# Patient Record
Sex: FEMALE | Race: WHITE | NOT HISPANIC OR LATINO | Employment: FULL TIME | URBAN - METROPOLITAN AREA
[De-identification: names, ages, dates, MRNs, and addresses within clinical notes are randomized per-mention and may not be internally consistent; named-entity substitution may affect disease eponyms.]

---

## 2017-08-26 ENCOUNTER — OFFICE VISIT (OUTPATIENT)
Dept: URGENT CARE | Facility: CLINIC | Age: 32
End: 2017-08-26
Payer: COMMERCIAL

## 2017-08-26 VITALS
HEART RATE: 81 BPM | HEIGHT: 69 IN | BODY MASS INDEX: 18.81 KG/M2 | DIASTOLIC BLOOD PRESSURE: 62 MMHG | WEIGHT: 127 LBS | SYSTOLIC BLOOD PRESSURE: 104 MMHG | TEMPERATURE: 99 F | RESPIRATION RATE: 16 BRPM | OXYGEN SATURATION: 100 %

## 2017-08-26 DIAGNOSIS — B34.9 VIRAL SYNDROME: Primary | ICD-10-CM

## 2017-08-26 PROCEDURE — 99203 OFFICE O/P NEW LOW 30 MIN: CPT | Mod: 25,S$GLB,, | Performed by: PHYSICIAN ASSISTANT

## 2017-08-26 PROCEDURE — 3008F BODY MASS INDEX DOCD: CPT | Mod: S$GLB,,, | Performed by: PHYSICIAN ASSISTANT

## 2017-08-26 PROCEDURE — 96372 THER/PROPH/DIAG INJ SC/IM: CPT | Mod: S$GLB,,, | Performed by: PHYSICIAN ASSISTANT

## 2017-08-26 RX ORDER — AZITHROMYCIN 250 MG/1
TABLET, FILM COATED ORAL
Qty: 6 TABLET | Refills: 0 | Status: SHIPPED | OUTPATIENT
Start: 2017-08-26 | End: 2018-06-14

## 2017-08-26 RX ORDER — METHYLPREDNISOLONE 4 MG/1
TABLET ORAL
Qty: 1 PACKAGE | Refills: 0 | Status: SHIPPED | OUTPATIENT
Start: 2017-08-27 | End: 2018-06-14

## 2017-08-26 RX ORDER — BETAMETHASONE SODIUM PHOSPHATE AND BETAMETHASONE ACETATE 3; 3 MG/ML; MG/ML
6 INJECTION, SUSPENSION INTRA-ARTICULAR; INTRALESIONAL; INTRAMUSCULAR; SOFT TISSUE ONCE
Status: COMPLETED | OUTPATIENT
Start: 2017-08-26 | End: 2017-08-26

## 2017-08-26 RX ORDER — BENZONATATE 200 MG/1
200 CAPSULE ORAL 3 TIMES DAILY PRN
Qty: 30 CAPSULE | Refills: 0 | Status: SHIPPED | OUTPATIENT
Start: 2017-08-26 | End: 2017-09-05

## 2017-08-26 RX ORDER — CODEINE PHOSPHATE AND GUAIFENESIN 10; 100 MG/5ML; MG/5ML
5 SOLUTION ORAL NIGHTLY PRN
Qty: 50 ML | Refills: 0 | Status: SHIPPED | OUTPATIENT
Start: 2017-08-26 | End: 2017-09-05

## 2017-08-26 RX ADMIN — BETAMETHASONE SODIUM PHOSPHATE AND BETAMETHASONE ACETATE 6 MG: 3; 3 INJECTION, SUSPENSION INTRA-ARTICULAR; INTRALESIONAL; INTRAMUSCULAR; SOFT TISSUE at 06:08

## 2017-08-26 NOTE — PATIENT INSTRUCTIONS
"- Please return here or go to the Emergency Department for any concerns or worsening of condition.   - You have been prescribed antibiotics but your are instructed to withhold taking the antibiotics for the specified period of time discussed by the provider to see if your symptoms improve with other medications prescribed/suggested as your illness may be viral (viruses do not respond to antibiotics). If the antibiotics are started please take them to completion.    - If you were given a steroid shot in the clinic and have also been given a prescription for a steroid such as Prednisone or a Medrol Dose Pack, please begin taking them tomorrow as instructed or as listed on medication directions.    - If you were prescribed a narcotic medication, do not drive or operate heavy equipment or machinery while taking these medications.  - Please follow up with your primary care provider (PCP) or discussed specialist(s) as needed.             Viral Syndrome (Adult)  A viral illness may cause a number of symptoms. The symptoms depend on the part of the body that the virus affects. If it settles in your nose, throat, and lungs, it may cause cough, sore throat, congestion, and sometimes headache. If it settles in your stomach and intestinal tract, it may cause vomiting and diarrhea. Sometimes it causes vague symptoms like "aching all over," feeling tired, loss of appetite, or fever.  A viral illness usually lasts 1 to 2 weeks, but sometimes it lasts longer. In some cases, a more serious infection can look like a viral syndrome in the first few days of the illness. You may need another exam and additional tests to know the difference. Watch for the warning signs listed below.  Home care  Follow these guidelines for taking care of yourself at home:  · If symptoms are severe, rest at home for the first 2 to 3 days.  · Stay away from cigarette smoke - both your smoke and the smoke from others.  · You may use " over-the-counter acetaminophen or ibuprofen for fever, muscle aching, and headache, unless another medicine was prescribed for this. If you have chronic liver or kidney disease or ever had a stomach ulcer or GI bleeding, talk with your doctor before using these medicines. No one who is younger than 18 and ill with a fever should take aspirin. It may cause severe disease or death.  · Your appetite may be poor, so a light diet is fine. Avoid dehydration by drinking 8 to 12 8-ounce glasses of fluids each day. This may include water; orange juice; lemonade; apple, grape, and cranberry juice; clear fruit drinks; electrolyte replacement and sports drinks; and decaffeinated teas and coffee. If you have been diagnosed with a kidney disease, ask your doctor how much and what types of fluids you should drink to prevent dehydration. If you have kidney disease, drinking too much fluid can cause it build up in the your body and be dangerous to your health.  · Over-the-counter remedies won't shorten the length of the illness but may be helpful for cough, sore throat; and nasal and sinus congestion. Don't use decongestants if you have high blood pressure.  Follow-up care  Follow up with your healthcare provider if you do not improve over the next week.  Call 911  Get emergency medical care if any of the following occur:  · Convulsion  · Feeling weak, dizzy, or like you are going to faint  · Chest pain, shortness of breath, wheezing, or difficulty breathing  When to seek medical advice  Call your healthcare provider right away if any of these occur:  · Cough with lots of colored sputum (mucus) or blood in your sputum  · Chest pain, shortness of breath, wheezing, or difficulty breathing  · Severe headache; face, neck, or ear pain  · Severe, constant pain in the lower right side of your belly (abdominal)  · Continued vomiting (cant keep liquids down)  · Frequent diarrhea (more than 5 times a day); blood (red or black color) or mucus  in diarrhea  · Feeling weak, dizzy, or like you are going to faint  · Extreme thirst  · Fever of 100.4°F (38°C) or higher, or as directed by your healthcare provider  Date Last Reviewed: 9/25/2015  © 2976-3227 [employer]. [address] [place] [address]. All rights reserved. This information is not intended as a substitute for professional medical care. Always follow your healthcare professional's instructions.

## 2017-08-26 NOTE — PROGRESS NOTES
Subjective:       Patient ID: Kasey Cherry is a 32 y.o. female.    Chief Complaint: Cough    Patient states she started this mourning with body aches,productive cough, sore throat, and headache.      Cough   This is a new problem. The current episode started today. The problem has been gradually worsening. The problem occurs hourly. The cough is productive of sputum. Associated symptoms include headaches, nasal congestion, postnasal drip, rhinorrhea and a sore throat. Pertinent negatives include no chest pain, chills, ear pain, eye redness, fever, rash, shortness of breath or wheezing. Associated symptoms comments: Sore throat, headache, body ache . The symptoms are aggravated by lying down. Treatments tried: advil, tylenol. The treatment provided mild relief. Her past medical history is significant for asthma. There is no history of bronchitis.     Review of Systems   Constitution: Negative for chills, fever and malaise/fatigue.   HENT: Positive for headaches, postnasal drip, rhinorrhea and sore throat. Negative for congestion, ear pain and hoarse voice.    Eyes: Negative for blurred vision, discharge, pain, redness and visual disturbance.   Cardiovascular: Negative for chest pain, dyspnea on exertion, leg swelling, near-syncope and syncope.   Respiratory: Positive for cough and sputum production. Negative for shortness of breath and wheezing.    Hematologic/Lymphatic: Negative for adenopathy.   Skin: Negative for itching and rash.   Musculoskeletal: Positive for back pain (ache) and neck pain (ache). Negative for stiffness.   Gastrointestinal: Negative for abdominal pain, diarrhea, nausea and vomiting.   Neurological: Negative for dizziness, light-headedness and numbness.   Psychiatric/Behavioral: Negative for altered mental status.   Allergic/Immunologic: Negative for hives.   All other systems reviewed and are negative.      Objective:      Physical Exam   Constitutional: She is oriented to person, place, and  time. She appears well-developed and well-nourished.  Non-toxic appearance. She does not have a sickly appearance. She does not appear ill. No distress.   HENT:   Head: Normocephalic and atraumatic.   Right Ear: Tympanic membrane, external ear and ear canal normal.   Left Ear: Tympanic membrane, external ear and ear canal normal.   Nose: Nose normal. No epistaxis. Right sinus exhibits no maxillary sinus tenderness and no frontal sinus tenderness. Left sinus exhibits no maxillary sinus tenderness and no frontal sinus tenderness.   Mouth/Throat: Uvula is midline. No uvula swelling. Posterior oropharyngeal erythema present. No oropharyngeal exudate or posterior oropharyngeal edema.   Bilateral nasal mucosal erythema and clear congestion   Eyes: Pupils are equal, round, and reactive to light.   Neck: Normal range of motion. Neck supple.   Cardiovascular: Normal rate, regular rhythm and normal heart sounds.  Exam reveals no gallop and no friction rub.    No murmur heard.  Pulmonary/Chest: Effort normal and breath sounds normal. No respiratory distress. She has no decreased breath sounds. She has no wheezes. She has no rhonchi. She has no rales.   Musculoskeletal: Normal range of motion.   Lymphadenopathy:        Head (right side): No submental, no submandibular, no tonsillar, no preauricular, no posterior auricular and no occipital adenopathy present.        Head (left side): No submental, no submandibular, no tonsillar, no preauricular, no posterior auricular and no occipital adenopathy present.     She has no cervical adenopathy.        Right cervical: No posterior cervical adenopathy present.       Left cervical: No posterior cervical adenopathy present.        Right: No supraclavicular adenopathy present.        Left: No supraclavicular adenopathy present.   Neurological: She is alert and oriented to person, place, and time. She is not disoriented. Coordination and gait normal.   Skin: No abrasion, no ecchymosis, no  "laceration and no rash noted. No erythema.   Psychiatric: She has a normal mood and affect. Her behavior is normal.   Nursing note and vitals reviewed.      /62 (BP Location: Right arm, Patient Position: Sitting, BP Method: Medium (Automatic))   Pulse 81   Temp 99 °F (37.2 °C) (Oral)   Resp 16   Ht 5' 9" (1.753 m)   Wt 57.6 kg (127 lb)   LMP 08/03/2017   SpO2 100%   BMI 18.75 kg/m²      Assessment:       1. Viral syndrome        Plan:       Kasey was seen today for cough.    Diagnoses and all orders for this visit:    Viral syndrome  -     betamethasone acetate-betamethasone sodium phosphate injection 6 mg; Inject 1 mL (6 mg total) into the muscle once.  -     methylPREDNISolone (MEDROL DOSEPACK) 4 mg tablet; use as directed  -     benzonatate (TESSALON) 200 MG capsule; Take 1 capsule (200 mg total) by mouth 3 (three) times daily as needed for Cough.  -     guaifenesin-codeine 100-10 mg/5 ml (CHERATUSSIN AC)  mg/5 mL syrup; Take 5 mLs by mouth nightly as needed for Cough.  -     azithromycin (ZITHROMAX Z-BRITTANY) 250 MG tablet; Two tablets once on day one followed by one tablet daily for 5 days    - Please return here or go to the Emergency Department for any concerns or worsening of condition.   - You have been prescribed antibiotics but your are instructed to withhold taking the antibiotics for the specified period of time discussed by the provider to see if your symptoms improve with other medications prescribed/suggested as your illness may be viral (viruses do not respond to antibiotics). If the antibiotics are started please take them to completion.    - If you were given a steroid shot in the clinic and have also been given a prescription for a steroid such as Prednisone or a Medrol Dose Pack, please begin taking them tomorrow as instructed or as listed on medication directions.    - If you were prescribed a narcotic medication, do not drive or operate heavy equipment or machinery while taking " these medications.  - Please follow up with your primary care provider (PCP) or discussed specialist(s) as needed.

## 2018-06-11 ENCOUNTER — OFFICE VISIT (OUTPATIENT)
Dept: URGENT CARE | Facility: CLINIC | Age: 33
End: 2018-06-11
Payer: COMMERCIAL

## 2018-06-11 VITALS
HEIGHT: 69 IN | DIASTOLIC BLOOD PRESSURE: 75 MMHG | TEMPERATURE: 98 F | OXYGEN SATURATION: 99 % | WEIGHT: 127 LBS | HEART RATE: 76 BPM | RESPIRATION RATE: 16 BRPM | BODY MASS INDEX: 18.81 KG/M2 | SYSTOLIC BLOOD PRESSURE: 117 MMHG

## 2018-06-11 DIAGNOSIS — M79.644 PAIN OF RIGHT MIDDLE FINGER: ICD-10-CM

## 2018-06-11 DIAGNOSIS — S63.632A SPRAIN OF INTERPHALANGEAL JOINT OF RIGHT MIDDLE FINGER, INITIAL ENCOUNTER: Primary | ICD-10-CM

## 2018-06-11 PROCEDURE — 99214 OFFICE O/P EST MOD 30 MIN: CPT | Mod: S$GLB,,, | Performed by: EMERGENCY MEDICINE

## 2018-06-11 PROCEDURE — 3008F BODY MASS INDEX DOCD: CPT | Mod: CPTII,S$GLB,, | Performed by: EMERGENCY MEDICINE

## 2018-06-11 RX ORDER — MELOXICAM 15 MG/1
15 TABLET ORAL DAILY
Qty: 14 TABLET | Refills: 0 | Status: SHIPPED | OUTPATIENT
Start: 2018-06-11 | End: 2018-06-25

## 2018-06-11 NOTE — PROGRESS NOTES
"Subjective:       Patient ID: Kasey Cherry is a 33 y.o. female.    Vitals:    06/11/18 1647   BP: 117/75   Pulse: 76   Resp: 16   Temp: 97.7 °F (36.5 °C)   SpO2: 99%   Weight: 57.6 kg (127 lb)   Height: 5' 9" (1.753 m)       Chief Complaint: Hand Pain    Pt states pain with movement right third finger PIP JOINT x 1 month. Pt denies ACUTE  Injury, HOWEVER REMEMBERS ABOUT A MONTH AGO FALLING AND POSSIBLY INJURING HER FINGER. DOES NOT REMEMBER SPECIFICALLY WHAT HAPPENED AND IT HAD NOT BEEN BOTHERING HER MUCH AND STILL IS NOT THAT PAINFUL. SHE LOOKED AT IT AND NOTICED ULNAR DEVIATION OF THE FINGER AT THE PIP JOINT AND STATES WHEN HOLD THINGS AND FULL FLEXION AND MOST PAIN WHEN HOLDING PITCHERS OF WATER. THERE IS NO PALPATION BONY PAIN AND THERE IS NO SWELLING, NO EDEMA, AND NO BRUISING. WANTED TO MAKE SURE NOT BROKEN OR ANY GROWTH, AND CONCERNED BECAUSE HAS BEEN ACTING UP EVEN IF SLIGHT FOR 1 MONTH.      Hand Pain    The incident occurred more than 1 week ago. There was no injury mechanism. The pain is present in the right hand. The pain does not radiate. The pain is at a severity of 3/10. Pertinent negatives include no chest pain. The symptoms are aggravated by movement. She has tried nothing for the symptoms.     Review of Systems   Constitution: Negative for chills and fever.   HENT: Negative for sore throat.    Eyes: Negative for blurred vision.   Cardiovascular: Negative for chest pain.   Respiratory: Negative for shortness of breath.    Skin: Negative for rash.   Musculoskeletal: Positive for joint pain. Negative for back pain.   Gastrointestinal: Negative for abdominal pain, diarrhea, nausea and vomiting.   Neurological: Negative for headaches.   Psychiatric/Behavioral: The patient is not nervous/anxious.        Objective:      Physical Exam   Constitutional: She is oriented to person, place, and time. She appears well-developed and well-nourished. She is cooperative.  Non-toxic appearance. She does not appear " ill. No distress.   HENT:   Head: Normocephalic and atraumatic.   Right Ear: Hearing, tympanic membrane and ear canal normal.   Left Ear: Hearing, tympanic membrane and ear canal normal.   Nose: No mucosal edema, rhinorrhea or nasal deformity. No epistaxis. Right sinus exhibits no maxillary sinus tenderness and no frontal sinus tenderness. Left sinus exhibits no maxillary sinus tenderness and no frontal sinus tenderness.   Mouth/Throat: Uvula is midline and mucous membranes are normal. No trismus in the jaw. Normal dentition. No uvula swelling. No posterior oropharyngeal erythema.   Eyes: Conjunctivae and lids are normal.   Sclera clear bilat   Neck: Trachea normal, normal range of motion, full passive range of motion without pain and phonation normal. Neck supple.   Cardiovascular: Normal rate, regular rhythm, normal heart sounds, intact distal pulses and normal pulses.    Pulmonary/Chest: Effort normal and breath sounds normal. No respiratory distress.   Abdominal: Soft. Normal appearance and bowel sounds are normal.   Musculoskeletal: Normal range of motion. She exhibits deformity (VERY SLIGHT ULNAR DEVIATION AT THE PIP OF THE MIDDLE RIGHT FINGER. NO DISLOCATION, MOST NOTED ON FINGER EXTENSION. NO TTP, ROM NORMAL, NO ECCHYMOSIS, NO ERYTHEMA, NO CYSTIC STRUCTURES NOTED.). She exhibits no edema.   Neurological: She is alert and oriented to person, place, and time. She exhibits normal muscle tone.   Skin: Skin is warm, dry and intact. She is not diaphoretic. No pallor.   Psychiatric: She has a normal mood and affect. Her speech is normal and behavior is normal. Cognition and memory are normal.   Nursing note and vitals reviewed.        X-ray Finger 2 Or More Views Right    Result Date: 6/11/2018  EXAMINATION: XR FINGER 2 OR MORE VIEWS RIGHT CLINICAL HISTORY: Pain in right finger(s) TECHNIQUE: Three views right 3rd digit COMPARISON: None FINDINGS: Bones are well mineralized.  Alignment is within normal limits.  No  displaced fracture, dislocation or destructive osseous process.  Joint spaces are maintained.  No subcutaneous emphysema or radiodense retained foreign body.     No acute displaced fracture-dislocation identified. Electronically signed by: Michael Canchola MD Date:    06/11/2018 Time:    17:11      PLACED IN STATIC FINGER SPLINT  REFERRED TO ORTHOPEDICS HANDS FOR OUTPATIENT EVALUATION OF MILD PERSISTENT PAIN AND ULNAR DEVIATION. SUSPECT REMOTE LIGAMENTOUS INJURY/SPRAIN   Assessment:       1. Sprain of interphalangeal joint of right middle finger, initial encounter    2. Pain of right middle finger        Plan:       Kasey was seen today for hand pain.    Diagnoses and all orders for this visit:    Sprain of interphalangeal joint of right middle finger, initial encounter    Pain of right middle finger  -     X-Ray Finger 2 or More Views Right; Future  -     Ambulatory referral to Orthopedics    Other orders  -     meloxicam (MOBIC) 15 MG tablet; Take 1 tablet (15 mg total) by mouth once daily.          Patient Instructions   SEE FINGER SPRAIN SHEET  REST  ICE  ELEVATE  IMMOBILIZE WITH SPLINT GIVEN TO YOU OR FITTED SPLINT ALSO GIVEN TO YOU.  MOBIC RX DAILY FOR 7 DAYS  IF SYMPTOMS RESOLVED OK TO CANCEL YOUR ORTHOPEDICS APPOINTMENT  IF HAVING PERSISTENT PAIN, KEEP THE ORTHOPEDICS HANDS APPOINTMENT  I HAVE REFERRED YOU TO THEM TODAY AND THEY WILL CONTACT YOU TO MAKE APPOINTMENT    XRAY NEGATIVE/NORMAL PER RADIOLOGY READ AND REPORT COPIED FOR YOU  Finger Sprain  A sprain is a stretching or tearing of the ligaments that hold a joint together. There are no broken bones. Sprains take 3 to 6 weeks to heal.  A sprained finger may be treated with a splint or andriy tape. This is when you tape the injured finger to the one next to it for support. Minor sprains may require no additional support.  Home care  · Keep your hand elevated to reduce pain and swelling. This is very important during the first 48 hours.  · Apply an ice pack over  the injured area for 15 to 20 minutes every 3 to 6 hours. You should do this for the first 24 to 48 hours. You can make an ice pack by filling a plastic bag that seals at the top with ice cubes and then wrapping it with a thin towel. Continue the use of ice packs for relief of pain and swelling as needed. As the ice melts, be careful to avoid getting any wrap or splint wet. After 48 hours, apply heat (warm shower or warm bath) for 15 to 20 minutes several times a day, or alternate ice and heat.  · If buddy tape was applied and it becomes wet or dirty, change it. You may replace it with paper, plastic or cloth tape. Cloth tape and paper tapes must be kept dry. Apply gauze or cotton padding between the fingers, especially at the webbed space. This will help prevent the skin from getting moist and breaking down. Keep the buddy tape in place for at least 4 weeks, or as instructed by your healthcare provider.  · If a splint was applied, wear it for the time advised.  · You may use over-the-counter pain medicine to control pain, unless another pain medicine was prescribed. If you have chronic liver or kidney disease or ever had a stomach ulcer or GI bleeding, talk with your healthcare provider before using these medicines.  Follow-up care  Follow up with your healthcare provider as directed. Finger joints will become stiff if immobile for too long. If a splint was applied, ask your healthcare provider when it is safe to begin range-of-motion exercises.  Sometimes fractures dont show up on the first X-ray. Bruises and sprains can sometimes hurt as much as a fracture. These injuries can take time to heal completely. If your symptoms dont improve or they get worse, talk with your healthcare provider. You may need a repeat X-ray. If X-rays were taken, you will be told of any new findings that may affect your care.  When to seek medical advice  Call your healthcare provider right away if any of these occur:  · Pain or  swelling increases  · Fingers or hand becomes cold, blue, numb, or tingly  Date Last Reviewed: 11/20/2015  © 5177-6997 [employer]. [address] [place] [address], [place] [address]. All rights reserved. This information is not intended as a substitute for professional medical care. Always follow your healthcare professional's instructions.

## 2018-06-11 NOTE — PATIENT INSTRUCTIONS
SEE FINGER SPRAIN SHEET  REST  ICE  ELEVATE  IMMOBILIZE WITH SPLINT GIVEN TO YOU OR FITTED SPLINT ALSO GIVEN TO YOU.  MOBIC RX DAILY FOR 7 DAYS  IF SYMPTOMS RESOLVED OK TO CANCEL YOUR ORTHOPEDICS APPOINTMENT  IF HAVING PERSISTENT PAIN, KEEP THE ORTHOPEDICS HANDS APPOINTMENT  I HAVE REFERRED YOU TO THEM TODAY AND THEY WILL CONTACT YOU TO MAKE APPOINTMENT    XRAY NEGATIVE/NORMAL PER RADIOLOGY READ AND REPORT COPIED FOR YOU  Finger Sprain  A sprain is a stretching or tearing of the ligaments that hold a joint together. There are no broken bones. Sprains take 3 to 6 weeks to heal.  A sprained finger may be treated with a splint or buddy tape. This is when you tape the injured finger to the one next to it for support. Minor sprains may require no additional support.  Home care  · Keep your hand elevated to reduce pain and swelling. This is very important during the first 48 hours.  · Apply an ice pack over the injured area for 15 to 20 minutes every 3 to 6 hours. You should do this for the first 24 to 48 hours. You can make an ice pack by filling a plastic bag that seals at the top with ice cubes and then wrapping it with a thin towel. Continue the use of ice packs for relief of pain and swelling as needed. As the ice melts, be careful to avoid getting any wrap or splint wet. After 48 hours, apply heat (warm shower or warm bath) for 15 to 20 minutes several times a day, or alternate ice and heat.  · If buddy tape was applied and it becomes wet or dirty, change it. You may replace it with paper, plastic or cloth tape. Cloth tape and paper tapes must be kept dry. Apply gauze or cotton padding between the fingers, especially at the webbed space. This will help prevent the skin from getting moist and breaking down. Keep the buddy tape in place for at least 4 weeks, or as instructed by your healthcare provider.  · If a splint was applied, wear it for the time advised.  · You may use over-the-counter pain medicine to control  pain, unless another pain medicine was prescribed. If you have chronic liver or kidney disease or ever had a stomach ulcer or GI bleeding, talk with your healthcare provider before using these medicines.  Follow-up care  Follow up with your healthcare provider as directed. Finger joints will become stiff if immobile for too long. If a splint was applied, ask your healthcare provider when it is safe to begin range-of-motion exercises.  Sometimes fractures dont show up on the first X-ray. Bruises and sprains can sometimes hurt as much as a fracture. These injuries can take time to heal completely. If your symptoms dont improve or they get worse, talk with your healthcare provider. You may need a repeat X-ray. If X-rays were taken, you will be told of any new findings that may affect your care.  When to seek medical advice  Call your healthcare provider right away if any of these occur:  · Pain or swelling increases  · Fingers or hand becomes cold, blue, numb, or tingly  Date Last Reviewed: 11/20/2015 © 2000-2017 [employer]. [address] [place] [address], [place] [address]. All rights reserved. This information is not intended as a substitute for professional medical care. Always follow your healthcare professional's instructions.

## 2018-06-12 ENCOUNTER — TELEPHONE (OUTPATIENT)
Dept: ORTHOPEDICS | Facility: CLINIC | Age: 33
End: 2018-06-12

## 2018-06-12 NOTE — TELEPHONE ENCOUNTER
Spoke with pt , appt made to see Kristie for UC f/u from 6/11/18. appt at 8:00am 6/14/18. appt details given pt verbalized understanding.

## 2018-06-12 NOTE — TELEPHONE ENCOUNTER
----- Message from Lila Kraus sent at 6/12/2018 11:35 AM CDT -----  Contact: Self - [phone]  Pt has an appt on 6/26 from and Urgent Care referral she was given yesterday. Would like to know if she can be worked in a little bit sooner. Pls return call to advise. Pt can be reached at [phone].    Thanks!  Lila Kraus  Access Navigator  [phone]

## 2018-06-14 ENCOUNTER — OFFICE VISIT (OUTPATIENT)
Dept: ORTHOPEDICS | Facility: CLINIC | Age: 33
End: 2018-06-14
Payer: COMMERCIAL

## 2018-06-14 VITALS
DIASTOLIC BLOOD PRESSURE: 71 MMHG | HEIGHT: 69 IN | HEART RATE: 82 BPM | SYSTOLIC BLOOD PRESSURE: 111 MMHG | WEIGHT: 127 LBS | BODY MASS INDEX: 18.81 KG/M2

## 2018-06-14 DIAGNOSIS — M24.241: ICD-10-CM

## 2018-06-14 DIAGNOSIS — M79.644 PAIN OF RIGHT MIDDLE FINGER: Primary | ICD-10-CM

## 2018-06-14 PROCEDURE — 99999 PR PBB SHADOW E&M-EST. PATIENT-LVL III: CPT | Mod: PBBFAC,,, | Performed by: PHYSICIAN ASSISTANT

## 2018-06-14 PROCEDURE — 99203 OFFICE O/P NEW LOW 30 MIN: CPT | Mod: S$GLB,,, | Performed by: PHYSICIAN ASSISTANT

## 2018-06-14 PROCEDURE — 3008F BODY MASS INDEX DOCD: CPT | Mod: CPTII,S$GLB,, | Performed by: PHYSICIAN ASSISTANT

## 2018-06-14 NOTE — PROGRESS NOTES
"Subjective:      Patient ID: Kasey Cherry is a 33 y.o. female.    Chief Complaint: Pain of the Right Hand      HPI  Kasey Cherry is a right hand dominant 33 y.o. female presenting today for right long finger pain, only with lifting.  She says that cooking or lifting with the right hand causes 3-4/10 pain in the long finger.  There was a history of trauma, she did have a fall where she may have injured the finger.  Onset of symptoms began 1 month ago.  She has also noticed some swelling of the right long finger PIP and slight deviation of the finger. She denies any finger numbness or tingling.      Review of patient's allergies indicates:  No Known Allergies      Current Outpatient Prescriptions   Medication Sig Dispense Refill    meloxicam (MOBIC) 15 MG tablet Take 1 tablet (15 mg total) by mouth once daily. 14 tablet 0     No current facility-administered medications for this visit.        Past Medical History:   Diagnosis Date    Asthma        History reviewed. No pertinent surgical history.      Review of Systems:  Review of Systems   Constitution: Negative for chills and fever.   Skin: Negative for rash and suspicious lesions.   Musculoskeletal:        See HPI   Neurological: Negative for dizziness, headaches, light-headedness, numbness and paresthesias.   Psychiatric/Behavioral: Negative for depression. The patient is not nervous/anxious.          OBJECTIVE:     PHYSICAL EXAM:  Height: 5' 9" (175.3 cm) Weight: 57.6 kg (127 lb)  Vitals:    06/14/18 0803   BP: 111/71   Pulse: 82   Weight: 57.6 kg (127 lb)   Height: 5' 9" (1.753 m)   PainSc:   2   PainLoc: Hand     General    Vitals reviewed.  Constitutional: She is oriented to person, place, and time. She appears well-developed and well-nourished.   HENT:   Head: Normocephalic and atraumatic.   Neck: Normal range of motion.   Cardiovascular: Normal rate.    Pulmonary/Chest: Effort normal. No respiratory distress.   Neurological: She is alert and oriented to " person, place, and time.   Psychiatric: She has a normal mood and affect. Her behavior is normal. Judgment and thought content normal.           Musculoskeletal:  There is slight edema noted over the radial aspect of the right long finger PIP, slight ulnar deviation of the right long finger distal to the PIP.  No ecchymosis.  No scars.  Very mildly tender to palpation over the right long finger PIP.  There is very slight laxity noted at the right long PIP radially.  Good finger range of motion, good wrist range of motion. Neurovascular intact-good sensation and motor function, good capillary refill, 2+ radial pulses.      RADIOGRAPHS:  Right Long finger X-Ray, 6/11/18  FINDINGS:  Bones are well mineralized.  Alignment is within normal limits.  No displaced fracture, dislocation or destructive osseous process.  Joint spaces are maintained.  No subcutaneous emphysema or radiodense retained foreign body.   Impression   No acute displaced fracture-dislocation identified.     Comments: I have personally reviewed the imaging and I agree with the above radiologist's report.    ASSESSMENT/PLAN:   Kasey was seen today for pain.    Diagnoses and all orders for this visit:    Pain of right middle finger  -     Ambulatory Referral to Physical/Occupational Therapy    Ligamentous laxity of hand, right  -     Ambulatory Referral to Physical/Occupational Therapy           - We talked at length about the anatomy and pathophysiology of   Encounter Diagnoses   Name Primary?    Pain of right middle finger Yes    Ligamentous laxity of hand, right        - orders placed for a OT, custom splint  - follow-up in 4-6 weeks, if not improved will consider further imaging  - Tylenol as needed for pain  - call with questions or concerns    Disclaimer: This note has been generated using voice-recognition software. There may be typographical errors that have been missed during proof-reading.

## 2018-06-14 NOTE — LETTER
June 14, 2018      Michael Butt MD  [address] [facility] [address]           [facility]  [address], [address]  [facility] [address]  Phone: [phone]          Patient: Kasey Cherry   MR Number: [id number]   YOB: 1985   Date of Visit: 6/14/2018       Dear Dr. Michael Butt:    Thank you for referring Kasey Cherry to me for evaluation. Attached you will find relevant portions of my assessment and plan of care.    If you have questions, please do not hesitate to call me. I look forward to following Kasey Cherry along with you.    Sincerely,    TIFFANI Kennedy    Enclosure  CC:  No Recipients    If you would like to receive this communication electronically, please contact [email or web address] or [phone] to request more information on [employer] access.    For providers and/or their staff who would like to refer a patient to [facility], please contact us through our one-stop-shop provider referral line, Sheridan Aranda, at [phone].    If you feel you have received this communication in error or would no longer like to receive these types of communications, please e-mail [email or web address]

## 2018-06-25 ENCOUNTER — CLINICAL SUPPORT (OUTPATIENT)
Dept: REHABILITATION | Facility: HOSPITAL | Age: 33
End: 2018-06-25
Payer: COMMERCIAL

## 2018-06-25 DIAGNOSIS — M79.644 FINGER PAIN, RIGHT: ICD-10-CM

## 2018-06-25 PROCEDURE — 97165 OT EVAL LOW COMPLEX 30 MIN: CPT | Mod: PO

## 2018-06-25 PROCEDURE — L3933 FO W/O JOINTS CF: HCPCS | Mod: PO

## 2018-06-25 NOTE — PATIENT INSTRUCTIONS
Home Exercise Program: EVERY OTHER DAY      1. Putty :     Squeeze putty in hand trying to keep it round by rotating putty after each squeeze. Push fingers through putty to palm each time. Avoid excessive pain. 20 squeezes, 3 times per day.     2. Putty Pinch:    Roll up the putty to create a small tubular section. Next, pinch the putty and repeat down the section with just your index finger and your thumb.  Repeat with your index and middle finger with your thumb. Try to avoid hyperextension of the thumb.   Repeat each direction 15 pinches. 3 times per day.      3.  Thumb Putty Pinch:    Hold the putty at the top of your hand. Squeeze the putty between your thumb and the side of your 2nd finger as shown. Repeat 15 pinches. 3 times per day.

## 2018-06-25 NOTE — PLAN OF CARE
"  [facility] Occupational Therapy  Initial Evaluation     Date: 6/25/2018  Patient: Kasey Cherry  Chart Number: [id number]  Referring Physician: MD Lanie  Therapy Diagnosis:   1. Finger pain, right         Medical Diagnosis: M79.644 (ICD-10-CM) - Pain of right middle finger, M24.241 (ICD-10-CM) - Ligamentous laxity of hand, right  Physician Orders: Custom orthosis for right long PIP, eval and treat   Evaluation Date: 6/25/2018  Plan of Care Certification Date: 7/25/18  Authorization Period: 12/31/18  Date of Return to MD: 7/12/18    Visit #: 1 of 20  Time In: 3pm  Time Out: 345pm  Total Billable Time: 45    Precautions:  Standard    Subjective     Involved Side: Right  Dominant Side: Right  Date of Onset: "About a month ago"  Mechanism of Injury: Pt states insidious onset but she did have a fall. She did not have any pain in her finger following the fall however.   History of Current Condition: Pt states her finger started hurting while she was cooking, she went to urgent care was prescribed medication issued estella splint and followed up with hand clinic. Since taking Mobic she reports near full symptom resolution but still wanted to come to therapy.   Surgical Procedure: N/A  Imaging: "No acute displaced fracture-dislocation identified." per Dr. Canchola  Previous Therapy: None  Patient's Goals for Therapy: Continued symptom relief following cessation of Mobic     Pain:  Functional Pain Scale Rating 0-10:   n/a/10 on average  n/a/10 at best  n/a/10 at worst    Functional Limitations/Social History:    Previous functional status includes: Independent with all ADLs.     Current Functional Status: Independent with all ADLs.     Occupation:     Working presently: employed  Duties: Typing, "Computer work"    Past Medical History/Physical Systems Review:   Kasey Cherry  has a past medical history of Asthma.    Kasey Cherry  has no past surgical history on file.    Kasey has a current " medication list which includes the following prescription(s): meloxicam.    Review of patient's allergies indicates:  No Known Allergies       Objective     Mental status: alert    Observation:   Deformities noted: slight ulnar deviation at DIP of R LF    Range of Motion:   WNL for composite fist and wrist      Strength: (MOJGAN Dynamometer in lbs.) Average 3 trials, Position II:     6/25/2018 6/25/2018    Left Right   Rung II 52# 50#       Pinch Strength (Measured in psi)     6/25/2018 6/25/2018    Left Right   Key Pinch 9 psi 10  psi   3pt Pinch 8 psi 9 psi   2pt Pinch 4 psi 5 psi       CMS Impairment/Limitation/Restriction for FOTO Hand Survey    Therapist reviewed FOTO scores for Kasey Cherry on 6/25/2018.   FOTO documents entered into [email or web address] - see Media section.    Limitation Score: 72%           Treatment     Treatment Time In: 3:20  Treatment Time Out: 3:45  Total Treatment time separate from Evaluation time:25    Fabricated a DB orthosis to R LF to protect tendon, maintain immobilization of R LF, or  to improve functional hand use.  Instructed to wear 24/7 with removal for HEP, bathing/hygiene.  Instructed to monitor for pain/pressure, increased edema, or redness and to contact office for adjustments as needed. Patient reported good understanding of orthotic wear and care schedule. Pt also educated on wearing splint if symptoms return as currently she has no symptoms.     Home Exercise Program/Education:  Issued HEP (see patient instructions in EMR) and educated on modality use for pain management . Exercises were reviewed and Kasey was able to demonstrate them prior to the end of the session.   Pt received a written copy of exercises to perform at home. Kasey demonstrated good  understanding of the education provided.  Pt was advised to perform these exercises free of pain, and to stop performing them if pain occurs.    Patient/Family Education: role of OT, goals for OT, scheduling/cancellations - pt  verbalized understanding. Discussed insurance limitations with patient.        Assessment     Kasey Cherry is a 33 y.o. female referred to outpatient occupational/hand therapy and presents with a medical diagnosis of R ligamentous injury of LF, resulting in decreased  and finger pain and demonstrates limitations as described in the chart below. Following brief medical record review it is determined that pt will benefit from occupational therapy services in order to maximize pain free and/or functional use of right finger/hand. The following goals were discussed with the patient and patient is in agreement with them as to be addressed in the treatment plan. The patient's rehab potential is Excellent.     Anticipated barriers to occupational therapy: None  Pt has no cultural, educational or language barriers to learning provided.    Profile and History Assessment of Occupational Performance Level of Clinical Decision Making Complexity Score   Occupational Profile:   Kasey Cherry is a 33 y.o. female who lives with their family and is currently employed as .      Comorbidities:   None    Medical and Therapy History Review:   Brief               Performance Deficits    Physical:   Strength  Pinch Strength    Cognitive:  No Deficits    Psychosocial:    No Deficits     Clinical Decision Making:  low    Assessment Process:  Comprehensive Assessments    Modification/Need for Assistance:  Not Necessary    Intervention Selection:  Multiple Treatment Options       low  Based on PMHX, co morbidities , data from assessments and functional level of assistance required with task and clinical presentation directly impacting function.         Goals:     Short Term (4 weeks on ):  1)   Patient to be IND with HEP and modalities for pain management  3)   Increase  strength 5-10 lbs. to grasp steering wheel.  4)   Increase pinch 1-3 psis for fasteners.   6)   Patient to be IND wiht Orthotic use, wear and care  precautions.         Plan     Pt to be treated by Occupational Therapy 1 times per week for 4 weeks during the certification period from 6/25/2018 to 7/25/18 to achieve the established goals.     Treatment to include: Paraffin, Fluidotherapy, Manual therapy/joint mobilizations, Modalities for pain management, US 3 mhz, Therapeutic exercises/activities., Strengthening, Orthotic Fabrication/Fit/Training, Joint Protection and Energy Conservation, as well as any other treatments deemed necessary based on the patient's needs or progress.

## 2018-07-12 ENCOUNTER — OFFICE VISIT (OUTPATIENT)
Dept: ORTHOPEDICS | Facility: CLINIC | Age: 33
End: 2018-07-12
Payer: COMMERCIAL

## 2018-07-12 VITALS
HEIGHT: 69 IN | BODY MASS INDEX: 18.81 KG/M2 | SYSTOLIC BLOOD PRESSURE: 103 MMHG | HEART RATE: 74 BPM | WEIGHT: 127 LBS | DIASTOLIC BLOOD PRESSURE: 68 MMHG

## 2018-07-12 DIAGNOSIS — M79.644 FINGER PAIN, RIGHT: Primary | ICD-10-CM

## 2018-07-12 PROCEDURE — 3008F BODY MASS INDEX DOCD: CPT | Mod: CPTII,S$GLB,, | Performed by: PHYSICIAN ASSISTANT

## 2018-07-12 PROCEDURE — 99999 PR PBB SHADOW E&M-EST. PATIENT-LVL III: CPT | Mod: PBBFAC,,, | Performed by: PHYSICIAN ASSISTANT

## 2018-07-12 PROCEDURE — 99213 OFFICE O/P EST LOW 20 MIN: CPT | Mod: S$GLB,,, | Performed by: PHYSICIAN ASSISTANT

## 2018-07-12 NOTE — LETTER
July 12, 2018      Michael Butt MD  [address] [facility] [address]           [facility]  [address], [address]  [facility] [address]  Phone: [phone]          Patient: Kasey Cherry   MR Number: [id number]   YOB: 1985   Date of Visit: 7/12/2018       Dear Dr. Michael Butt:    Thank you for referring Kasey Cherry to me for evaluation. Attached you will find relevant portions of my assessment and plan of care.    If you have questions, please do not hesitate to call me. I look forward to following Kasey Cherry along with you.    Sincerely,    TIFFANI Kennedy    Enclosure  CC:  No Recipients    If you would like to receive this communication electronically, please contact [email or web address] or [phone] to request more information on [employer] Link access.    For providers and/or their staff who would like to refer a patient to [facility], please contact us through our one-stop-shop provider referral line, Sheridan Aranda, at [phone].    If you feel you have received this communication in error or would no longer like to receive these types of communications, please e-mail [email or web address]

## 2018-07-12 NOTE — PROGRESS NOTES
"Subjective:      Patient ID: Kasey Cherry is a 33 y.o. female.    Chief Complaint: Pain of the Right Hand      HPI  Kasey Cherry is a right hand dominant 33 y.o. female presenting today for follow up of right long finger pain.  She attended OT and a custom finger splint was provided, she reports that she "didn't really wear it, because it stopped hurting."  She was also given home exercises to perform, she has been doing those on occasion.  She says that she still gets mild pains in the finger with lifting and it still appears "slightly crooked." There is mild "stiffness" in the long finger PIP. She reports that it is mostly not bothersome.  There was a history of trauma, she did have a fall where she may have injured the finger.        Review of patient's allergies indicates:  No Known Allergies      No current outpatient prescriptions on file.     No current facility-administered medications for this visit.        Past Medical History:   Diagnosis Date    Asthma        History reviewed. No pertinent surgical history.      Review of Systems:  Review of Systems   Constitution: Negative for chills and fever.   Skin: Negative for rash and suspicious lesions.   Musculoskeletal:        See HPI   Neurological: Negative for dizziness, headaches, light-headedness, numbness and paresthesias.   Psychiatric/Behavioral: Negative for depression. The patient is not nervous/anxious.          OBJECTIVE:     PHYSICAL EXAM:  Height: 5' 9" (175.3 cm) Weight: 57.6 kg (127 lb)  Vitals:    07/12/18 0832   BP: 103/68   Pulse: 74   Weight: 57.6 kg (127 lb)   Height: 5' 9" (1.753 m)   PainSc:   3   PainLoc: Hand     General    Vitals reviewed.  Constitutional: She is oriented to person, place, and time. She appears well-developed and well-nourished.   HENT:   Head: Normocephalic and atraumatic.   Neck: Normal range of motion.   Cardiovascular: Normal rate.    Pulmonary/Chest: Effort normal. No respiratory distress.   Neurological: She is " alert and oriented to person, place, and time.   Psychiatric: She has a normal mood and affect. Her behavior is normal. Judgment and thought content normal.           Musculoskeletal:  No significant edema noted over the right long finger PIP, slight ulnar deviation of the right long finger distal to the PIP.  No ecchymosis.  No scars.  Nontender to palpation over the right long finger PIP.   Good finger range of motion, good wrist range of motion. Neurovascular intact-good sensation and motor function, good capillary refill, 2+ radial pulses.      RADIOGRAPHS:  Right Long finger X-Ray, 6/11/18  FINDINGS:  Bones are well mineralized.  Alignment is within normal limits.  No displaced fracture, dislocation or destructive osseous process.  Joint spaces are maintained.  No subcutaneous emphysema or radiodense retained foreign body.   Impression   No acute displaced fracture-dislocation identified.     Comments: I have personally reviewed the imaging and I agree with the above radiologist's report.    ASSESSMENT/PLAN:   Kasey was seen today for pain.    Diagnoses and all orders for this visit:    Finger pain, right           - We talked at length about the anatomy and pathophysiology of   Encounter Diagnosis   Name Primary?    Finger pain, right Yes       - Continue HEP from OT  - Tylenol as needed for pain  - call with questions or concerns    Disclaimer: This note has been generated using voice-recognition software. There may be typographical errors that have been missed during proof-reading.

## 2018-09-28 ENCOUNTER — OFFICE VISIT (OUTPATIENT)
Dept: INTERNAL MEDICINE | Facility: CLINIC | Age: 33
End: 2018-09-28
Payer: COMMERCIAL

## 2018-09-28 VITALS
HEIGHT: 69 IN | WEIGHT: 128.75 LBS | SYSTOLIC BLOOD PRESSURE: 92 MMHG | BODY MASS INDEX: 19.07 KG/M2 | DIASTOLIC BLOOD PRESSURE: 64 MMHG | OXYGEN SATURATION: 98 % | HEART RATE: 78 BPM

## 2018-09-28 DIAGNOSIS — B34.9 VIRAL SYNDROME: Primary | ICD-10-CM

## 2018-09-28 DIAGNOSIS — J30.89 SEASONAL AND PERENNIAL ALLERGIC RHINITIS: ICD-10-CM

## 2018-09-28 DIAGNOSIS — J30.2 SEASONAL AND PERENNIAL ALLERGIC RHINITIS: ICD-10-CM

## 2018-09-28 PROCEDURE — 3008F BODY MASS INDEX DOCD: CPT | Mod: CPTII,S$GLB,, | Performed by: INTERNAL MEDICINE

## 2018-09-28 PROCEDURE — 99999 PR PBB SHADOW E&M-EST. PATIENT-LVL III: CPT | Mod: PBBFAC,,, | Performed by: INTERNAL MEDICINE

## 2018-09-28 PROCEDURE — 99214 OFFICE O/P EST MOD 30 MIN: CPT | Mod: S$GLB,,, | Performed by: INTERNAL MEDICINE

## 2018-09-28 RX ORDER — IPRATROPIUM BROMIDE 21 UG/1
1 SPRAY, METERED NASAL 3 TIMES DAILY PRN
Qty: 30 ML | Refills: 11 | Status: SHIPPED | OUTPATIENT
Start: 2018-09-28

## 2018-09-28 NOTE — PATIENT INSTRUCTIONS
Common Cold Treatment:  1. Take Claritin 1 tablet daily.  2. Take Sudafed 1 tablet twice daily for 5 days.  2. Use Atrovent nasal 1 spray every hours as needed.  3. Take Acetaminophen (Tylenol) 500 m tablet every 8 hours until throat pain is resolved (about 3-5 days).    Alternative Therapies to Help You Recover:  1. You may take warm chicken soup or ginger tea or lemon with honey as needed 1-3 times to alleviate your symptoms.  2. Take Vitamin C Emergency as directed on the bottle.    If not better after 3 days or if fever, please message me for further treatment.

## 2018-09-28 NOTE — PROGRESS NOTES
INTERNAL MEDICINE CLINIC - SAME DAY APPOINTMENT  Progress Note    PRESENTING HISTORY     PCP: Ashkan Coleman MD  Chief Complaint/Reason for Visit:     Chief Complaint   Patient presents with    Sore Throat    Sinusitis    Fatigue     History of Present Illness & ROS : Ms. Kasey Cherry is a 33 y.o. female.      She was in [place] and started with sore throat 3 days ago.  She returned yesterday via plane.  Running nose. Mild cough. No chest pain.  Mild SOB.  No fever.  No chills.  Body ache.    Took Sudafed and Advil.  Some improvement.    She takes Claritin daily.    PAST HISTORY:     Past Medical History:   Diagnosis Date    Childhood asthma     Seasonal and perennial allergic rhinitis 9/28/2018       No past surgical history on file.    Family History   Problem Relation Age of Onset    No Known Problems Mother     No Known Problems Father        Social History     Socioeconomic History    Marital status:      Spouse name: None    Number of children: None    Years of education: None    Highest education level: None   Social Needs    Financial resource strain: None    Food insecurity - worry: None    Food insecurity - inability: None    Transportation needs - medical: None    Transportation needs - non-medical: None   Occupational History    None   Tobacco Use    Smoking status: Never Smoker    Smokeless tobacco: Never Used   Substance and Sexual Activity    Alcohol use: No    Drug use: None    Sexual activity: None   Other Topics Concern    None   Social History Narrative    None       MEDICATIONS & ALLERGIES:     Claritin PRN.     No current facility-administered medications on file prior to visit.         Review of patient's allergies indicates:  No Known Allergies    Medications Reconciliation:   I have reconciled the patient's home medications with the patient/family. I have updated all changes.  Refer to After-Visit Medication List.    OBJECTIVE:     Vital Signs:  Vitals:     18 1318   BP: 92/64   Pulse: 78     Wt Readings from Last 1 Encounters:   18 1318 58.4 kg (128 lb 12 oz)     Body mass index is 19.01 kg/m².     Physical Exam:  General: Well developed, well nourished. No distress.  HEENT: Head is normocephalic, atraumatic.  Ears: both external ears are normal.  TMs are normal bilaterally.  Nasal turbinates - moderately swollen and erythematous.  Pharynx - moderately swollen and erythematous.  Sinus - moderate tenderness on palpation.  Eyes: Clear conjunctiva bilaterally.  Neck: Supple, symmetrical neck; trachea midline.  Lymph Nodes: No cervical or supraclavicular adenopathy.  Lungs: Clear to auscultation bilaterally and normal respiratory effort.  Cardiovascular: Heart with regular rate and rhythm.      ASSESSMENT & PLAN:     Viral syndrome  - Symptomatic treatment.  -     ipratropium (ATROVENT) 0.03 % nasal spray; 1 spray by Nasal route 3 (three) times daily as needed for Rhinitis.  Dispense: 30 mL; Refill: 11    Seasonal and perennial allergic rhinitis  - She takes Claritin PRN.    Instructions for the patient:  Common Cold Treatment:  1. Take Claritin 1 tablet daily.  2. Take Sudafed 1 tablet twice daily for 5 days.  2. Use Atrovent nasal 1 spray every hours as needed.  3. Take Acetaminophen (Tylenol) 500 m tablet every 8 hours until throat pain is resolved (about 3-5 days).    Alternative Therapies to Help You Recover:  1. You may take warm chicken soup or ginger tea or lemon with honey as needed 1-3 times to alleviate your symptoms.  2. Take Vitamin C Emergency as directed on the bottle.    If not better after 3 days or if fever, please message me for further treatment.    After Visit Medication List :     Medication List           Accurate as of 18  1:37 PM. If you have any questions, ask your nurse or doctor.               START taking these medications    ipratropium 0.03 % nasal spray  Commonly known as:  ATROVENT  1 spray by Nasal route 3 (three)  times daily as needed for Rhinitis.  Started by:  Michael Schwartz MD           Where to Get Your Medications      These medications were sent to [facility] [address] [place] [address] [address] [facility]  [address] [facility] [address]    Phone:  [phone]   · ipratropium 0.03 % nasal spray         Signing Physician:  Michael Schwartz MD